# Patient Record
Sex: FEMALE | Race: WHITE
[De-identification: names, ages, dates, MRNs, and addresses within clinical notes are randomized per-mention and may not be internally consistent; named-entity substitution may affect disease eponyms.]

---

## 2018-09-05 ENCOUNTER — HOSPITAL ENCOUNTER (EMERGENCY)
Dept: HOSPITAL 74 - JERFT | Age: 43
Discharge: HOME | End: 2018-09-05
Payer: COMMERCIAL

## 2018-09-05 VITALS — HEART RATE: 82 BPM | SYSTOLIC BLOOD PRESSURE: 137 MMHG | TEMPERATURE: 98.5 F | DIASTOLIC BLOOD PRESSURE: 86 MMHG

## 2018-09-05 VITALS — BODY MASS INDEX: 32 KG/M2

## 2018-09-05 DIAGNOSIS — L50.0: Primary | ICD-10-CM

## 2018-09-05 PROCEDURE — 3E023GC INTRODUCTION OF OTHER THERAPEUTIC SUBSTANCE INTO MUSCLE, PERCUTANEOUS APPROACH: ICD-10-PCS

## 2018-09-05 PROCEDURE — 3E0233Z INTRODUCTION OF ANTI-INFLAMMATORY INTO MUSCLE, PERCUTANEOUS APPROACH: ICD-10-PCS

## 2018-09-05 NOTE — PDOC
History of Present Illness





- General


Chief Complaint: Rash


Stated Complaint: RASH


Time Seen by Provider: 09/05/18 10:19


History Source: Patient


Exam Limitations: Clinical Condition





- History of Present Illness


Initial Comments: 





09/05/18 10:49


Patient with no sig Past medical history present with complain of 2 weeks 

history of persistent diffuse urticarial rash which has not been improving with 

oral prednisone, oral hydroxyzine and topical steroid cream given to her by the 

urgent care. Patient does not know what caused the rash. Patient reported rash 

as very itchy and red .Denies shortness of breath, choking sensation or 

swelling to lips.


Timing/Duration: other (2 weeks)





Past History





- Past Medical History


Allergies/Adverse Reactions: 


 Allergies











Allergy/AdvReac Type Severity Reaction Status Date / Time


 


No Known Allergies Allergy   Verified 09/05/18 09:46











Home Medications: 


Ambulatory Orders





Budesonide/Formeterol Fumarate [SYMBICORT 160/4.5mcg -] 2 inh IH BID #0 inhaler 

10/24/13 


Albuterol Sulfate Inhaler - [Ventolin HFA Inhaler -] 1 - 2 inh PO Q4H #1 

inhaler 10/08/15 


Famotidine [Pepcid] 20 mg PO BID #20 tablet 09/05/18 


Hydrocortisone 2.5% Topical Cr [Anusol-Hc -] 1 applic TP BID PRN #1 tube 09/05/ 18 


Montelukast Sodium [Singulair] 10 mg PO HS 09/05/18 


Prednisone [Deltasone] 20 mg PO BID #20 tablet 09/05/18 








Anemia: No


Asthma: Yes


COPD: No





- Surgical History


Cholecystectomy: Yes





- Suicide/Smoking/Psychosocial Hx


Smoking Status: No


Smoking History: Never smoked


Have you smoked in the past 12 months: No


Hx Alcohol Use: No


Drug/Substance Use Hx: No


Substance Use Type: None


Hx Substance Use Treatment: No





**Review of Systems





- Review of Systems


Able to Perform ROS?: Yes


Is the patient limited English proficient: No


Constitutional: No: Chills, Diaphoresis, Fever, Loss of Appetite, Malaise, 

Night Sweats, Weakness, Weight Stable, Unintentional Wgt. Loss, Unexplained wgt 

Loss, Other


Respiratory: No: Cough, Orthopnea, Shortness of Breath, SOB with Exertion, SOB 

at Rest, Stridor, Wheezing, Productive cough, Hemoptysis, Other


Cardiac (ROS): No: Chest Pain, Edema, Irregular Heart Rate, Lightheadedness, 

Palpitations, Syncope, Chest Tightness, Other


ABD/GI: No: Abdominal Distended, Abd. Pain w/ defecation, Blood Streaked Bowels

, Constipated, Diarrhea, Difficulty Swallowing, Nausea, Poor Appetite, Poor 

Fluid Intake, Rectal Bleeding, Vomiting, Indigestion, Abdominal cramping, Tarry 

Stools, Other


Integumentary: Yes: Pruritus (all over the body), Rash (hives all over the body)

.  No: Bruising, Change in Color, Change in Hair/Nails, Dryness, Erythema, 

Flushing, Lesions, Lumps, Pallor, Sweating, Other


Neurological: No: Headache, Numbness, Paresthesia, Pre-Existing Deficit, Seizure

, Tingling, Tremors, Weakness, Unsteady Gait, Ataxia, Dizziness, Other


All Other Systems: Reviewed and Negative





*Physical Exam





- Vital Signs


 Last Vital Signs











Temp Pulse Resp BP Pulse Ox


 


 98.5 F   82   18   137/86   99 


 


 09/05/18 09:42  09/05/18 09:42  09/05/18 09:42  09/05/18 09:42  09/05/18 09:42














- Physical Exam


Comments: 





09/05/18 10:51


GENERAL:


Well developed, well nourished. Awake and alert. No acute distress.


HEENT:


Normocephalic, atraumatic. PERRLA, EOMI. No conjunctival pallor. Sclera are non-

icteric. Moist mucous membranes. Oropharynx is clear.


NECK: 


Supple. Full ROM. No JVD. Carotid pulses 2+ and symmetric, without bruits. No 

thyromegaly. No lymphadenopathy.


CARDIOVASCULAR:


Regular rate and rhythm. No murmurs, rubs, or gallops. Distal pulses are 2+ and 

symmetric. 


PULMONARY: 


No evidence of respiratory distress. Lungs clear to auscultation bilaterally. 

No wheezing, rales or rhonchi.


ABDOMINAL:


Soft. Non-tender. Non-distended. No rebound or guarding. No organomegaly. 

Normoactive bowel sounds. 


MUSCULOSKELETAL 


Normal range of motion at all joints. No bony deformities or tenderness. No CVA 

tenderness.


EXTREMITIES: 


No cyanosis. No clubbing. No edema. No calf tenderness.


SKIN: Diffuse erythematous urticarial rash all over the body without 

excoriation 


NEUROLOGICAL: 


Alert, awake, appropriate. Cranial nerves 2-12 intact. No deficits to light 

touch and temperature in face, upper extremities and lower extremities. No 

motor deficits in the in face, upper extremities and lower extremities. 

Normoreflexic in the upper and lower extremities. Normal speech. Toes are down-

going bilaterally. Gait is normal without ataxia.


PSYCHIATRIC: 


Cooperative. Good eye contact. Appropriate mood and affect.


General Appearance: Yes: Nourished, Apparent Distress.  No: Appropriately 

Dressed





ED Treatment Course





- Medications


Given in the ED: 


ED Medications














Discontinued Medications














Generic Name Dose Route Start Last Admin





  Trade Name Freq  PRN Reason Stop Dose Admin


 


Dexamethasone Sodium Phosphate  10 mg  09/05/18 10:28  09/05/18 10:34





  Decadron Injection -  IM  09/05/18 10:29  10 mg





  ONCE ONE   Administration





     





     





     





     














Medical Decision Making





- Medical Decision Making





09/05/18 10:52


Patient with no sig Past medical history present with complain of 2 weeks 

history of persistent hives all over the body which is very itchy and has not 

been responding to steroid and antihistamine medications. Exam shows diffuse 

urticarial rash all over the body. Decadron 10 mg IM given. Benadryl 50 mg IM 

given. Patient be discharged home on high-dose prednisone and Pepcid with 

dermatology follow-up





*DC/Admit/Observation/Transfer


Diagnosis at time of Disposition: 


 Allergic urticaria, Dermatitis








- Discharge Dispostion


Disposition: HOME


Condition at time of disposition: Stable


Decision to Admit order: No





- Prescriptions


Prescriptions: 


Famotidine [Pepcid] 20 mg PO BID #20 tablet


Hydrocortisone 2.5% Topical Cr [Anusol-Hc -] 1 applic TP BID PRN #1 tube


 PRN Reason: rash


Prednisone [Deltasone] 20 mg PO BID #20 tablet





- Referrals


Referrals: 


Isela Taylor MD [Primary Care Provider] - 


Remy Lawrence MD [Non Staff, Medical] - 





- Patient Instructions


Printed Discharge Instructions:  Hives, DI for Hives


Additional Instructions: 


Take prescribed medication as prescribed. Follow-up with referred dermatology 

as soon as possible





- Post Discharge Activity

## 2021-09-13 ENCOUNTER — HOSPITAL ENCOUNTER (EMERGENCY)
Dept: HOSPITAL 74 - JER | Age: 46
Discharge: HOME | End: 2021-09-13
Payer: COMMERCIAL

## 2021-09-13 VITALS — TEMPERATURE: 98.2 F | SYSTOLIC BLOOD PRESSURE: 118 MMHG | HEART RATE: 83 BPM | DIASTOLIC BLOOD PRESSURE: 87 MMHG

## 2021-09-13 VITALS — BODY MASS INDEX: 25.4 KG/M2

## 2021-09-13 DIAGNOSIS — R09.89: Primary | ICD-10-CM
